# Patient Record
Sex: MALE | Race: WHITE | NOT HISPANIC OR LATINO | URBAN - METROPOLITAN AREA
[De-identification: names, ages, dates, MRNs, and addresses within clinical notes are randomized per-mention and may not be internally consistent; named-entity substitution may affect disease eponyms.]

---
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
## 2021-10-05 ENCOUNTER — EMERGENCY (EMERGENCY)
Facility: HOSPITAL | Age: 21
LOS: 1 days | Discharge: HOME | End: 2021-10-05
Attending: EMERGENCY MEDICINE
Payer: COMMERCIAL

## 2021-10-05 VITALS
TEMPERATURE: 98 F | WEIGHT: 164.91 LBS | SYSTOLIC BLOOD PRESSURE: 126 MMHG | HEART RATE: 81 BPM | RESPIRATION RATE: 20 BRPM | HEIGHT: 66 IN | DIASTOLIC BLOOD PRESSURE: 90 MMHG | OXYGEN SATURATION: 98 %

## 2021-10-05 DIAGNOSIS — Q43.3 CONGENITAL MALFORMATIONS OF INTESTINAL FIXATION: Chronic | ICD-10-CM

## 2021-10-05 LAB
ALBUMIN SERPL ELPH-MCNC: 4.8 G/DL — SIGNIFICANT CHANGE UP (ref 3.3–5.2)
ALP SERPL-CCNC: 62 U/L — SIGNIFICANT CHANGE UP (ref 40–120)
ALT FLD-CCNC: 12 U/L — SIGNIFICANT CHANGE UP
ANION GAP SERPL CALC-SCNC: 10 MMOL/L — SIGNIFICANT CHANGE UP (ref 5–17)
AST SERPL-CCNC: 17 U/L — SIGNIFICANT CHANGE UP
BASOPHILS # BLD AUTO: 0.04 K/UL — SIGNIFICANT CHANGE UP (ref 0–0.2)
BASOPHILS NFR BLD AUTO: 0.7 % — SIGNIFICANT CHANGE UP (ref 0–2)
BILIRUB SERPL-MCNC: 0.6 MG/DL — SIGNIFICANT CHANGE UP (ref 0.4–2)
BUN SERPL-MCNC: 13.7 MG/DL — SIGNIFICANT CHANGE UP (ref 8–20)
CALCIUM SERPL-MCNC: 9.9 MG/DL — SIGNIFICANT CHANGE UP (ref 8.6–10.2)
CHLORIDE SERPL-SCNC: 102 MMOL/L — SIGNIFICANT CHANGE UP (ref 98–107)
CO2 SERPL-SCNC: 26 MMOL/L — SIGNIFICANT CHANGE UP (ref 22–29)
CREAT SERPL-MCNC: 0.81 MG/DL — SIGNIFICANT CHANGE UP (ref 0.5–1.3)
EOSINOPHIL # BLD AUTO: 0.05 K/UL — SIGNIFICANT CHANGE UP (ref 0–0.5)
EOSINOPHIL NFR BLD AUTO: 0.9 % — SIGNIFICANT CHANGE UP (ref 0–6)
GLUCOSE SERPL-MCNC: 100 MG/DL — HIGH (ref 70–99)
HCT VFR BLD CALC: 43.4 % — SIGNIFICANT CHANGE UP (ref 39–50)
HGB BLD-MCNC: 15.2 G/DL — SIGNIFICANT CHANGE UP (ref 13–17)
IMM GRANULOCYTES NFR BLD AUTO: 0 % — SIGNIFICANT CHANGE UP (ref 0–1.5)
LYMPHOCYTES # BLD AUTO: 1.99 K/UL — SIGNIFICANT CHANGE UP (ref 1–3.3)
LYMPHOCYTES # BLD AUTO: 36.6 % — SIGNIFICANT CHANGE UP (ref 13–44)
MAGNESIUM SERPL-MCNC: 2 MG/DL — SIGNIFICANT CHANGE UP (ref 1.8–2.6)
MCHC RBC-ENTMCNC: 29.3 PG — SIGNIFICANT CHANGE UP (ref 27–34)
MCHC RBC-ENTMCNC: 35 GM/DL — SIGNIFICANT CHANGE UP (ref 32–36)
MCV RBC AUTO: 83.6 FL — SIGNIFICANT CHANGE UP (ref 80–100)
MONOCYTES # BLD AUTO: 0.36 K/UL — SIGNIFICANT CHANGE UP (ref 0–0.9)
MONOCYTES NFR BLD AUTO: 6.6 % — SIGNIFICANT CHANGE UP (ref 2–14)
NEUTROPHILS # BLD AUTO: 3 K/UL — SIGNIFICANT CHANGE UP (ref 1.8–7.4)
NEUTROPHILS NFR BLD AUTO: 55.2 % — SIGNIFICANT CHANGE UP (ref 43–77)
PLATELET # BLD AUTO: 310 K/UL — SIGNIFICANT CHANGE UP (ref 150–400)
POTASSIUM SERPL-MCNC: 4.5 MMOL/L — SIGNIFICANT CHANGE UP (ref 3.5–5.3)
POTASSIUM SERPL-SCNC: 4.5 MMOL/L — SIGNIFICANT CHANGE UP (ref 3.5–5.3)
PROT SERPL-MCNC: 7.3 G/DL — SIGNIFICANT CHANGE UP (ref 6.6–8.7)
RBC # BLD: 5.19 M/UL — SIGNIFICANT CHANGE UP (ref 4.2–5.8)
RBC # FLD: 11.9 % — SIGNIFICANT CHANGE UP (ref 10.3–14.5)
SODIUM SERPL-SCNC: 138 MMOL/L — SIGNIFICANT CHANGE UP (ref 135–145)
WBC # BLD: 5.44 K/UL — SIGNIFICANT CHANGE UP (ref 3.8–10.5)
WBC # FLD AUTO: 5.44 K/UL — SIGNIFICANT CHANGE UP (ref 3.8–10.5)

## 2021-10-05 PROCEDURE — 72131 CT LUMBAR SPINE W/O DYE: CPT | Mod: 26,MA

## 2021-10-05 PROCEDURE — 70450 CT HEAD/BRAIN W/O DYE: CPT | Mod: 26,MA

## 2021-10-05 PROCEDURE — 99243 OFF/OP CNSLTJ NEW/EST LOW 30: CPT

## 2021-10-05 PROCEDURE — 99218: CPT

## 2021-10-05 RX ORDER — ACETAMINOPHEN 500 MG
650 TABLET ORAL ONCE
Refills: 0 | Status: COMPLETED | OUTPATIENT
Start: 2021-10-05 | End: 2021-10-05

## 2021-10-05 RX ADMIN — Medication 650 MILLIGRAM(S): at 18:17

## 2021-10-05 NOTE — ED CDU PROVIDER INITIAL DAY NOTE - ATTENDING CONTRIBUTION TO CARE
I, Curry Terry, personally saw the patient with the resident, and completed the key components of the history and physical exam. I then discussed the management plan with the resident.    20 yo M p/w left calf numbness x 3 days, pain worse with lying down and resolved after standing up. He also had intermittent right arm numbness this morning that resolved. Numbness posterior calf down to foot. no other focal deficit. no arm drift. no leg drift. no facial droop. normal ambulation.    CT head negative. CT lumbar showed Schmorl's nodes involving superior end plate of S1. patient seen by ortho for spine, pending MRI.

## 2021-10-05 NOTE — CONSULT NOTE ADULT - ASSESSMENT
MRI reviewed with no significant HNP or DDD. Pain mngt or physiatry eval rec. WBAT and PT eval. MRI reviewed with no significant HNP or DDD. Pain mngt or physiatry eval rec. WBAT and PT eval.    Attending statement:  I have personally seen this patient, and formed a face to face diagnostic evaluation on this patient on this date.  I have reviewed the PA, NP and or Medical/PA student and/or Resident documentation and agree with the history, physical exam and plan of care except if noted otherwise.

## 2021-10-05 NOTE — ED PROVIDER NOTE - CLINICAL SUMMARY MEDICAL DECISION MAKING FREE TEXT BOX
VIRGINIA is a 21 year old man with no significant PMH presenting to the ED complaining of change in sensation of his left calf x 3 days with transient change in sensation in his right thigh and right arm this AM. Patient with sensory deficits in LLE at level of calf when compared to right, but otherwise patient with benign physical examination. With symptoms of abnormal sensation present when sitting or laying down and resolving when up and walking, presentation likely due to more proximal nerve impingement versus distal, although focal abnormality would suggest more distal. CT head, CT lumbar spine, CBC, CMP, Mg ordered.

## 2021-10-05 NOTE — ED CDU PROVIDER INITIAL DAY NOTE - OBJECTIVE STATEMENT
21y M w/ no significant PMH, presenting for L calf/foot numbness x 3 days.  Symptoms are present when lying down and resolve upon standing up.  Also had some R sided paresthesias earlier today that resolved.  No trauma, back pain, bowel/bladder dysfunction, fever, weakness.

## 2021-10-05 NOTE — ED ADULT TRIAGE NOTE - CHIEF COMPLAINT QUOTE
Pt arrives to ED stating " I have this weird sensation in my L leg since Saturday , like numbness and tingling , now its progressing to my R leg and my R arm feels weird "Denies any recent vaccination or sickness. Symptoms get worse with rest

## 2021-10-05 NOTE — ED CDU PROVIDER INITIAL DAY NOTE - PHYSICAL EXAMINATION
Constitutional: Awake, alert, in no acute distress  Eyes: no scleral icterus  HENT: normocephalic, atraumatic, moist oral mucosa  Neck: supple  CV: RRR, no murmur  Pulm: non-labored respirations, CTAB  Abdomen: soft, non-tender, non-distended  Extremities: no edema, no deformity  Skin: no rash, no jaundice  Neuro: AAOx3, CNs II-XII intact, no facial asymmetry, 5/5 strength in all extremities, +diminished sensation to medial aspect of L calf/foot, stable gait.

## 2021-10-05 NOTE — ED PROVIDER NOTE - OBJECTIVE STATEMENT
VIRGINIA is a 21 year old man with no significant PMH presenting to the ED complaining of change in sensation of his left calf x 3 days with transient change in sensation in his right thigh and right arm this AM. Patient stated that three days ago he woke up and when moving his left leg along the covers felt the sensation was off in his left calf. He had difficulty describing what it felt like, but denied a pins and needle sensation. The change in sensation is constant when he is sitting or laying down, but will dissipate when he stands up and walks around. Upon sitting back down or standing in place this abnormal sensation will then return. This AM, he had a brief moment at work when pulling his phone out of his pocket he felt that same abnormal sensation in his right thigh, but then ceased and did not return. Right arm sensation was described to be pins and needles. Patient denied recent sickness, vaccination, trauma, wooded-area exposure, rash. Patient has been working as a  for the past 6 months and endorsed roughly 30 minutes per day working on his back. Patient denied difficulty ambulating or back and leg pain.

## 2021-10-05 NOTE — ED ADULT NURSE NOTE - CAS ELECT INFOMATION PROVIDED
DC instructions provided and reviewed with pt by NURY Rollins. Patient and family in understanding of all dc instructions. No further questions for the RN regarding DC. VSS. Ambulatory with steady gait./DC instructions

## 2021-10-05 NOTE — CONSULT NOTE ADULT - SUBJECTIVE AND OBJECTIVE BOX
Pt Name: BECKY OJEDA    MRN: [id number]    Patient is a 21y Male presenting to the emergency department with a chief complaint of left calf numbness x 4 days  Patient reports left calf numbness that is worse when laying down or sitting  Numbness resolves with ambulation  Denies weakness, loss of bowel or bladder function  No other complaints    HEALTH ISSUES - PROBLEM Dx:    REVIEW OF SYSTEMS    General: Alert, responsive, in NAD    Skin/Breast: No rashes, no pruritis   	  Ophthalmologic: No visual changes. No redness.   	  ENMT:	No discharge. No swelling.    Respiratory and Thorax: No difficulty breathing. No cough.  	   Cardiovascular:	No chest pain. No palpitations.    Gastrointestinal:	 No abdominal pain. No diarrhea.     Genitourinary: No dysuria. No bleeding.    Musculoskeletal: SEE HPI.    Neurological: No sensory or motor changes.     Psychiatric: No anxiety or depression.    Hematology/Lymphatics: No swelling.    Endocrine: No Hx of diabetes.    ROS is otherwise negative.    PAST MEDICAL & SURGICAL HISTORY:  No pertinent past medical history    Congenital malrotation of intestine    Allergies: Bactrim (Hives)    Medications:     FAMILY HISTORY:  : non-contributory    Ambulation: Walking independently                          15.2   5.44  )-----------( 310      ( 05 Oct 2021 11:27 )             43.4     10-05    138  |  102  |  13.7  ----------------------------<  100<H>  4.5   |  26.0  |  0.81    Ca    9.9      05 Oct 2021 11:27  Mg     2.0     10-05    TPro  7.3  /  Alb  4.8  /  TBili  0.6  /  DBili  x   /  AST  17  /  ALT  12  /  AlkPhos  62  10-05      PHYSICAL EXAM:    Vital Signs Last 24 Hrs  T(C): 36.8 (05 Oct 2021 15:46), Max: 36.8 (05 Oct 2021 15:46)  T(F): 98.3 (05 Oct 2021 15:46), Max: 98.3 (05 Oct 2021 15:46)  HR: 76 (05 Oct 2021 15:46) (76 - 81)  BP: 119/80 (05 Oct 2021 15:46) (119/80 - 126/90)  BP(mean): --  RR: 20 (05 Oct 2021 09:46) (20 - 20)  SpO2: 99% (05 Oct 2021 15:46) (98% - 99%)  Daily Height in cm: 167.64 (05 Oct 2021 09:46)    Daily     Appearance: Alert, responsive, in no acute distress.    Skin: no rash on visible skin. Skin is clean, dry and intact. No bleeding. No abrasions. No ulcerations.    Vascular: 2+ distal pulses. Cap refill < 2 sec. No signs of venous  insufficiency  or stasis. No extremity ulcerations. No cyanosis.    Musculoskeletal:         Left Upper Extremity: Atraumatic with normal alignment NROM. No crepitus. No bony tenderness.        Right Upper Extremity: Atraumatic with normal alignment NROM. No crepitus. No bony tenderness.        Left Lower Extremity: Atraumatic with normal alignment NROM. No crepitus. No bony tenderness.        Right Lower Extremity: Atraumatic with normal alignment NROM. No crepitus. No bony tenderness.     Neurological: Sensation to light touch is decreased to left calf, no other deficits    Pathologic reflexes: Neg Hameed,  No Clonus            Reflexes:   Biceps, Brachioradialis, Patella, Achilles intact            Motor exam: [  ]          Upper extremities - grossly intact without deficit            Lower extremities  - grossly intact without deficit      Imaging Studies:    EXAM:  CT LUMBAR SPINE                         EXAM:  CT BRAIN                          PROCEDURE DATE:  10/05/2021      INTERPRETATION:  Noncontrast CT of the brain and lumbar spine    CLINICAL INDICATION: Left lower extremity weakness    TECHNIQUE: Axial CT scanning of the brain and lumbar spine were obtained without the administration of intravenous contrast.  Images were reformatted in the sagittal and coronal planes.    COMPARISON: None available    FINDINGS:    CT brain:    No hydrocephalus, mass effect, midline shift, acute intracranial hemorrhage, or brain edema.    Visualized paranasal sinuses and mastoid air cells are clear.    CT lumbar spine:    Lumbarization of the S1 vertebral body, therefore, the most fully formed inferiorly imaged disc space will be named S1-S2 for the purposes of this examination.    No acute fracture or traumatic subluxation. No prevertebral or paravertebral edema.    Endplate erosions involving the superior endplate of S1 and inferior endplate ofL5, suspicious for the presence of Schmorl's nodes.    Vertebral body height and alignment are maintained. Mild disc space narrowing at L5-S1.    T12-L1: No spinal canal stenosis or neural foraminal narrowing.    L1-L2: No spinal canal stenosis or neural foraminal narrowing.    L2-L3: No spinal canal stenosis or neural foraminal narrowing.    L3-L4: No spinal canal stenosis or neural foraminal narrowing.    L4-L5: No spinal canal stenosis or neural foraminal narrowing.    L5-S1: Mild broad-based disc protrusion. Mild spinal canal stenosis. No neural foraminal narrowing.    S1-S2: No spinal canal stenosis or neural foraminal narrowing.    IMPRESSION:    CT brain:  No hydrocephalus, acute intracranial hemorrhage, mass effect, or brain edema.    CT lumbar spine:  No acute fracture or traumatic subluxation.  No prevertebral or paravertebral edema.  No significant spinal canal stenosis or neural foraminal narrowing.    Endplate erosions involving the superior endplate of S1 and inferior endplate of L5, suspicious for the presence of Schmorl's nodes.    A/P:  Patient is a 21y old Male who presents with a chief complaint of left calf numbness    PLAN:  Discussed with Dr. Delgado  * MRI ordered  * Treatment plan to be finalized after review of pending imaging studies

## 2021-10-05 NOTE — ED PROVIDER NOTE - ATTENDING CONTRIBUTION TO CARE
I, Dash Cortes, performed a face to face bedside interview with this patient regarding history of present illness, review of symptoms and relevant past medical, social and family history.  I completed an independent physical examination. I have communicated the patient’s plan of care and disposition with the student.  21 year old male with no PMH presents  with decreased sensation to the L calf x 3 days. Sx are constant but improved when he gets up and walks. No motor weakness, no weakness to the upper extremities, no loss of balance, fevers, chills, bowel/bladder dysfunction  Gen: NAD, well appearing  CV: RRR  Pul: CTA b/l  Abd: Soft, non-distended, non-tender  Neuro: isolated decreased sensation to the posterior and medial L calf. DTR 2/4, MS 5/5 b/l UE and LE, no ataxia  Pt to be placed on obs for MRI L spine

## 2021-10-05 NOTE — ED PROVIDER NOTE - NS ED ROS FT
CONSTITUTIONAL: No weakness, fevers or chills  EYES/ENT: No visual changes;  No vertigo or throat pain   NECK: No pain or stiffness  RESPIRATORY: No cough, wheezing, hemoptysis; No shortness of breath  CARDIOVASCULAR: No chest pain or palpitations  GASTROINTESTINAL: No abdominal or epigastric pain. No nausea, vomiting, or hematemesis; No diarrhea or constipation. No melena or hematochezia.  GENITOURINARY: No dysuria, frequency or hematuria  NEUROLOGICAL: + change in sensation  SKIN: No itching, burning, rashes, or lesions   All other review of systems is negative unless indicated above.

## 2021-10-05 NOTE — ED PROVIDER NOTE - PHYSICAL EXAMINATION
GEN: Man laying in bed in NAD.   HEENT: Normocephalic and atraumatic. Clear conjunctiva, non icteric. Moist mucosa. Neck supple. ZENA, EOMI  CV: Normal S1 and S2. No murmurs, rubs, or gallops. Peripheral pulse intact and palpable throughout. No LE edema.  RESP: Clear to auscultation bilaterally. No wheezes or rales. No increased work of breathing.   ABD: Soft, nondistended, nontender. No organomegaly  EXT: Moving all extremities equally bilaterally with full range of motion.  NEURO: A+Ox3, CNII-XII intact, Strength 5/5 in b/l upper extremities with no sensory deficits. Strength 5/5 in b/l lower extremities. Unequal sensation to light touch in lower extremities at level below the knee to above the ankle, Less perceived on left side per patient. Diminished two point discrimination in left lower extremity at level below the knee to above the ankle. Upon having patient take a few steps around examination room repeat examination demonstrates equal sensation b/l to light touch and better two point discrimination. Reflexes intact and equal throughout. Gait non-ataxic   BACK: No bony tenderness along length of spine and no obvious deformities notes.  SKIN: No rashes, warm and well perfused, brisk cap refill

## 2021-10-05 NOTE — ED CDU PROVIDER INITIAL DAY NOTE - MEDICAL DECISION MAKING DETAILS
21y M presenting for 3 days of L calf numbness.  Symptoms positional in nature.  CT L-spine showing possible Schmorl's nodes of L5/S1.  Ortho/spine consulted.  Pending MRI L-spine.

## 2021-10-05 NOTE — ED CDU PROVIDER INITIAL DAY NOTE - NS ED ROS FT
Constitutional: no fever, no chills  Eyes: no vision changes  ENT: no nasal congestion, no sore throat  CV: no chest pain  Resp: no cough, no shortness of breath  GI: no abdominal pain, no vomiting, no diarrhea  : no dysuria  MSK: no joint pain  Skin: no rash  Neuro: no headache, no weakness, +paresthesias

## 2021-10-05 NOTE — ED ADULT NURSE NOTE - NSIMPLEMENTINTERV_GEN_ALL_ED
Implemented All Universal Safety Interventions:  [place] to call system. Call bell, personal items and telephone within reach. Instruct patient to call for assistance. Room bathroom lighting operational. Non-slip footwear when patient is off stretcher. Physically safe environment: no spills, clutter or unnecessary equipment. Stretcher in lowest position, wheels locked, appropriate side rails in place.

## 2021-10-06 VITALS
RESPIRATION RATE: 15 BRPM | HEART RATE: 72 BPM | OXYGEN SATURATION: 98 % | TEMPERATURE: 98 F | SYSTOLIC BLOOD PRESSURE: 116 MMHG | DIASTOLIC BLOOD PRESSURE: 77 MMHG

## 2021-10-06 LAB
APPEARANCE UR: CLEAR — SIGNIFICANT CHANGE UP
BACTERIA # UR AUTO: NEGATIVE — SIGNIFICANT CHANGE UP
BILIRUB UR-MCNC: NEGATIVE — SIGNIFICANT CHANGE UP
CK SERPL-CCNC: 71 U/L — SIGNIFICANT CHANGE UP (ref 30–200)
COLOR SPEC: YELLOW — SIGNIFICANT CHANGE UP
CRP SERPL-MCNC: <4 MG/L — SIGNIFICANT CHANGE UP
DIFF PNL FLD: NEGATIVE — SIGNIFICANT CHANGE UP
EPI CELLS # UR: SIGNIFICANT CHANGE UP
ERYTHROCYTE [SEDIMENTATION RATE] IN BLOOD: 2 MM/HR — SIGNIFICANT CHANGE UP (ref 0–20)
GLUCOSE UR QL: NEGATIVE MG/DL — SIGNIFICANT CHANGE UP
KETONES UR-MCNC: NEGATIVE — SIGNIFICANT CHANGE UP
LEUKOCYTE ESTERASE UR-ACNC: NEGATIVE — SIGNIFICANT CHANGE UP
NITRITE UR-MCNC: NEGATIVE — SIGNIFICANT CHANGE UP
PH UR: 6 — SIGNIFICANT CHANGE UP (ref 5–8)
PROT UR-MCNC: 15 MG/DL
RBC CASTS # UR COMP ASSIST: SIGNIFICANT CHANGE UP /HPF (ref 0–4)
SP GR SPEC: 1.02 — SIGNIFICANT CHANGE UP (ref 1.01–1.02)
TSH SERPL-MCNC: 1.28 UIU/ML — SIGNIFICANT CHANGE UP (ref 0.27–4.2)
UROBILINOGEN FLD QL: NEGATIVE MG/DL — SIGNIFICANT CHANGE UP
WBC UR QL: SIGNIFICANT CHANGE UP

## 2021-10-06 PROCEDURE — 87086 URINE CULTURE/COLONY COUNT: CPT

## 2021-10-06 PROCEDURE — 72158 MRI LUMBAR SPINE W/O & W/DYE: CPT | Mod: 26,MG

## 2021-10-06 PROCEDURE — G0378: CPT

## 2021-10-06 PROCEDURE — 81001 URINALYSIS AUTO W/SCOPE: CPT

## 2021-10-06 PROCEDURE — 36415 COLL VENOUS BLD VENIPUNCTURE: CPT

## 2021-10-06 PROCEDURE — 84443 ASSAY THYROID STIM HORMONE: CPT

## 2021-10-06 PROCEDURE — 85652 RBC SED RATE AUTOMATED: CPT

## 2021-10-06 PROCEDURE — G1004: CPT

## 2021-10-06 PROCEDURE — 70450 CT HEAD/BRAIN W/O DYE: CPT | Mod: MA

## 2021-10-06 PROCEDURE — 86140 C-REACTIVE PROTEIN: CPT

## 2021-10-06 PROCEDURE — 83735 ASSAY OF MAGNESIUM: CPT

## 2021-10-06 PROCEDURE — 99217: CPT

## 2021-10-06 PROCEDURE — 99284 EMERGENCY DEPT VISIT MOD MDM: CPT | Mod: 25

## 2021-10-06 PROCEDURE — 82550 ASSAY OF CK (CPK): CPT

## 2021-10-06 PROCEDURE — 72158 MRI LUMBAR SPINE W/O & W/DYE: CPT | Mod: MG

## 2021-10-06 PROCEDURE — 72131 CT LUMBAR SPINE W/O DYE: CPT | Mod: MA

## 2021-10-06 PROCEDURE — 80053 COMPREHEN METABOLIC PANEL: CPT

## 2021-10-06 PROCEDURE — 85025 COMPLETE CBC W/AUTO DIFF WBC: CPT

## 2021-10-06 NOTE — ED CDU PROVIDER DISPOSITION NOTE - CARE PROVIDER_API CALL
Nathan Delgado (DO)  Orthopaedic Surgery  [address]  [place] [address]  Phone: [phone]  Fax: [phone]  Follow Up Time:     Gianfranco Espinoza; PhD)  Neurology; Vascular Neurology  [address], [address]  [place] [address]  Phone: [phone]  Fax: [phone]  Follow Up Time:

## 2021-10-06 NOTE — ED CDU PROVIDER DISPOSITION NOTE - CARE PROVIDERS DIRECT ADDRESSES
[email or web address]

## 2021-10-06 NOTE — ED CDU PROVIDER DISPOSITION NOTE - PATIENT PORTAL LINK FT
You can access the FollowMyHealth Patient Portal offered by [facility] by registering at the following website: [email or web address]. By joining [facility]’s FollowMyHealth portal, you will also be able to view your health information using other applications (apps) compatible with our system.

## 2021-10-06 NOTE — ED CDU PROVIDER DISPOSITION NOTE - ATTENDING CONTRIBUTION TO CARE
The patient seen and examined    leg numbness    I, Jluis Glover, performed the initial face to face bedside interview with this patient regarding history of present illness, review of symptoms and relevant past medical, social and family history.  I completed an independent physical examination.  I was the initial provider who evaluated this patient. I have signed out the follow up of any pending tests (i.e. labs, radiological studies) to the ACP.  I have communicated the patient’s plan of care and disposition with the ACP.

## 2021-10-06 NOTE — ED CDU PROVIDER DISPOSITION NOTE - CLINICAL COURSE
Patient placed into observation for left calf numbness/ L5/S1 endplate erosion on CT.  Seen by spine, reccomended MRI, which was unremarkable.  Seen by PT, stable for outpatient f/u.  Given copies of all results, understands and agrees to proceed.

## 2021-10-06 NOTE — ED CDU PROVIDER SUBSEQUENT DAY NOTE - HISTORY
PT placed in OBS, has had no acute incidents or complaints while in CDU PT is stable. PT Placed in OBS for MR to eval numbness of unilateral leg after pt had Ct that demonstrated lesion on corresponding area of spine to dermatomal distribution.

## 2021-10-06 NOTE — ED ADULT NURSE REASSESSMENT NOTE - NS ED NURSE REASSESS COMMENT FT1
Assumed care of patient from previous RN.  Patient with family at bedside.  Patient states decreased sensation to left calm while lying in bed, helps with ambulation.  Patient awaiting MRI at this time.  Safety maintained.
pt alert and oriented x4 sitting up eating sandwich denies pain or current decreased sensation. orthopedics at bedside. pt educated on plan of care, pt able to successfully teach back plan of care to RN, RN will continue to reeducate pt during hospital stay.
Assumed care of the patient at 0730. Verbal report received from [facility]. Patient A&Ox4. Family at the bedside. Patient in NAD. +diminished sensation to left calf (intermittent). Denies pain. Ambulatory with steady gait. Patient pending Spine consult. Patient in understanding of plan of care. Patient with no further questions for the RN. Resting in comfort. Call bell within reach and encouraged to use when assistance needed. Will continue to monitor.

## 2021-10-06 NOTE — ED CDU PROVIDER SUBSEQUENT DAY NOTE - PROGRESS NOTE DETAILS
MRI resulted, awaiting final reccomendations by ortho/spine by attending Danny, patient re-assessed reports resolution of symptoms, will check UA/UC

## 2021-10-07 PROBLEM — Z78.9 OTHER SPECIFIED HEALTH STATUS: Chronic | Status: ACTIVE | Noted: 2021-10-05

## 2021-10-07 LAB
CULTURE RESULTS: SIGNIFICANT CHANGE UP
SPECIMEN SOURCE: SIGNIFICANT CHANGE UP

## 2021-10-11 ENCOUNTER — APPOINTMENT (OUTPATIENT)
Dept: NEUROLOGY | Facility: CLINIC | Age: 21
End: 2021-10-11
Payer: COMMERCIAL

## 2021-10-11 VITALS
BODY MASS INDEX: 26.52 KG/M2 | DIASTOLIC BLOOD PRESSURE: 80 MMHG | WEIGHT: 165 LBS | HEIGHT: 66 IN | TEMPERATURE: 98 F | SYSTOLIC BLOOD PRESSURE: 140 MMHG

## 2021-10-11 DIAGNOSIS — Z78.9 OTHER SPECIFIED HEALTH STATUS: ICD-10-CM

## 2021-10-11 DIAGNOSIS — G57.32 LESION OF LATERAL POPLITEAL NERVE, LEFT LOWER LIMB: ICD-10-CM

## 2021-10-11 PROCEDURE — 95910 NRV CNDJ TEST 7-8 STUDIES: CPT

## 2021-10-11 PROCEDURE — 99204 OFFICE O/P NEW MOD 45 MIN: CPT | Mod: 25

## 2021-10-11 PROCEDURE — 95886 MUSC TEST DONE W/N TEST COMP: CPT

## 2021-10-11 NOTE — ASSESSMENT
[FreeTextEntry1] : This is a 21-year-old man with transient loss of sensation of the left calf. He was mostly positional related in that if he was sitting or laying down he lost the sensation of her once she got up and started walking the sensation returned. I be more concerned for possible compressive neuropathy rather than a central etiology. I will check an EMG and nerve conduction study of his left lower extremity. No call him with the results in schedule any further followup if needed.

## 2021-10-11 NOTE — REASON FOR VISIT
[Consultation] : a consultation visit [Other: _____] : [unfilled] [FreeTextEntry1] : loss of sensation left calf

## 2021-10-11 NOTE — CONSULT LETTER
[Dear  ___] : Dear  [unfilled], [Courtesy Letter:] : I had the pleasure of seeing your patient, [unfilled], in my office today. [Please see my note below.] : Please see my note below. [Consult Closing:] : Thank you very much for allowing me to participate in the care of this patient.  If you have any questions, please do not hesitate to contact me. [Sincerely,] : Sincerely, [FreeTextEntry3] : Gianfranco Espinoza M.D., Ph.D. DPN-N\par [facility]\par Neurology at [place]\par Medical Director of Stroke Services\par [facility]\par

## 2021-10-11 NOTE — HISTORY OF PRESENT ILLNESS
[FreeTextEntry1] : Initial office visit October 11, 2021:\par This is a 21-year-old man who presents today for neurologic evaluation. He had an episode about a week and a half ago where he could not feel the surface of his left calf. That is he did not feel the sheets against it therapy touch it does not feel. It wasn't a true numbness but more a loss of sensation. It happened in the posterior calf from the knee down to the ankle. He also felt briefly numbness in her right shoulder region. He went to the emergency room for this where CAT scan of his brain was done which was normal as was an MRI of his lumbar spine. He states that this lack of sensation subsided within 6 days. He is here today for neurologic evaluation of this issue.

## 2021-10-11 NOTE — PHYSICAL EXAM
[General Appearance - Alert] : alert [General Appearance - In No Acute Distress] : in no acute distress [General Appearance - Well Nourished] : well nourished [General Appearance - Well Developed] : well developed [Person] : oriented to person [Place] : oriented to place [Time] : oriented to time [Remote Intact] : remote memory intact [Registration Intact] : recent registration memory intact [Span Intact] : the attention span was normal [Concentration Intact] : normal concentrating ability [Visual Intact] : visual attention was ~T not ~L decreased [Naming Objects] : no difficulty naming common objects [Repeating Phrases] : no difficulty repeating a phrase [Fluency] : fluency intact [Comprehension] : comprehension intact [Current Events] : adequate knowledge of current events [Past History] : adequate knowledge of personal past history [Cranial Nerves Optic (II)] : visual acuity intact bilaterally,  visual fields full to confrontation, pupils equal round and reactive to light [Cranial Nerves Oculomotor (III)] : extraocular motion intact [Cranial Nerves Trigeminal (V)] : facial sensation intact symmetrically [Cranial Nerves Facial (VII)] : face symmetrical [Cranial Nerves Vestibulocochlear (VIII)] : hearing was intact bilaterally [Cranial Nerves Glossopharyngeal (IX)] : tongue and palate midline [Cranial Nerves Hypoglossal (XII)] : there was no tongue deviation with protrusion [Cranial Nerves Accessory (XI - Cranial And Spinal)] : head turning and shoulder shrug symmetric [Motor Tone] : muscle tone was normal in all four extremities [Motor Strength] : muscle strength was normal in all four extremities [No Muscle Atrophy] : normal bulk in all four extremities [Involuntary Movements] : no involuntary movements were seen [Paresis Pronator Drift Right-Sided] : no pronator drift on the right [Paresis Pronator Drift Left-Sided] : no pronator drift on the left [Motor Strength Upper Extremities Bilaterally] : strength was normal in both upper extremities [Motor Strength Lower Extremities Bilaterally] : strength was normal in both lower extremities [Sensation Tactile Decrease] : light touch was intact [Sensation Pain / Temperature Decrease] : pain and temperature was intact [Sensation Vibration Decrease] : vibration was intact [Proprioception] : proprioception was intact [Abnormal Walk] : normal gait [Balance] : balance was intact [Tremor] : no tremor present [Coordination - Dysmetria Impaired Finger-to-Nose Bilateral] : not present [2+] : Ankle jerk left 2+ [PERRL With Normal Accommodation] : pupils were equal in size, round, reactive to light, with normal accommodation [Sclera] : the sclera and conjunctiva were normal [Extraocular Movements] : extraocular movements were intact [No APD] : no afferent pupillary defect [No JOELLE] : no internuclear ophthalmoplegia [Full Visual Field] : full visual field

## 2021-10-11 NOTE — DATA REVIEWED
[de-identified] : CT of his head was done October 5, 2021. Images were reviewed. There is no acute stroke mass or bleed. As a normal study.\par \par Lumbar spine MRI was done October 6, 2021. Images were reviewed. There was no significant foraminal or central canal stenosis. There were no disc herniations. It was essentially a normal lumbar spine.

## 2023-04-28 NOTE — ED ADULT NURSE NOTE - [facility] ED NUR SYMPTOMS POS
Pt given discharge instructions, educated to follow up with cardiologist. Pt denies questions at this time. Reports chest pain 0/10.   The pt was educated to come back to the ER if pain persists or they are having an emergency.      NAUSEA/PAIN

## 2023-06-02 NOTE — ED PROVIDER NOTE - NS ED ATTENDING STATEMENT MOD
I have personally seen and examined this patient. I have fully participated in the care of this patient. I have reviewed all pertinent clinical information, including history, physical exam, plan and the Medical/PA/NP Student's note and agree except as noted. without difficulty